# Patient Record
Sex: MALE | Race: WHITE
[De-identification: names, ages, dates, MRNs, and addresses within clinical notes are randomized per-mention and may not be internally consistent; named-entity substitution may affect disease eponyms.]

---

## 2018-10-25 ENCOUNTER — HOSPITAL ENCOUNTER (OUTPATIENT)
Dept: HOSPITAL 59 - SUR | Age: 58
Discharge: HOME | End: 2018-10-25
Attending: ORTHOPAEDIC SURGERY
Payer: COMMERCIAL

## 2018-10-25 DIAGNOSIS — M22.42: ICD-10-CM

## 2018-10-25 DIAGNOSIS — S83.232A: Primary | ICD-10-CM

## 2018-10-25 DIAGNOSIS — M94.262: ICD-10-CM

## 2018-10-25 PROCEDURE — 29881 ARTHRS KNE SRG MNISECTMY M/L: CPT

## 2018-10-25 PROCEDURE — 01400 ANES OPN/ARTHRS KNEE JT NOS: CPT

## 2018-10-25 PROCEDURE — 93005 ELECTROCARDIOGRAM TRACING: CPT

## 2018-10-25 NOTE — OPERATIVE NOTE
DATE OF SURGERY: 10/25/2018 



Surgeon: Jeremy Lr DO



PREOPERATIVE DIAGNOSES: 

1. Torn medial meniscus of the left knee. 

2. Chondromalacia of the left knee. 



POSTOPERATIVE DIAGNOSES: 

1. Torn medial meniscus, left knee. 

2. 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 



OPERATION: Arthroscopic partial medial meniscectomy of the left knee. 



DESCRIPTION OF PROCEDURE: This 58-year-old male was taken to the operating room and placed in the 
supine position on the operating room table. General anesthetic was administered. The left lower 
extremity was elevated, exsanguinated, and the tourniquet inflated to 300 mmHg. The arthroscopic 
knee erwin applied. The left knee prepped with Hibiclens and draped in the usual sterile fashion. 



An inferolateral portal was established for the 4 mm arthroscope. Initial evaluation of the joint 
demonstrated mild grade 2 chondromalacia of the patella and the oblique facet and medial facet, 
lateral facet being relatively spared. The trochlea appeared normal. 



The medial compartment was entered and mild grade 2 chondromalacia of the medial femoral condyle was 
present. Again no loose fragments of articular cartilage and it was not further disturbed. The 
medial compartment also demonstrated a complex tear of the medial meniscus. We could see where he 
had had work done on the posterior horn of the medial meniscus previously. There was a remnant of a 
horizontal cleavage tear posteriorly. This was not terribly unstable but some loose flaps were 
present, and these were trimmed with the rotating shaver. However, the new tear started at about the 
10-o'clock position and extended anteriorly to about the 9-o'clock position. Utilizing the basket 
forceps and rotating shaver, we smoothed and trimmed and balanced the meniscus to a smooth stable 
rim and contoured. It was not unstable after probing. The medial compartment was suctioned. 



We then directed our attention to the intracondylar notch, which was seen to be normal. 



The lateral compartment was entered. No defects of the articular cartilage or meniscus on the 
lateral side of the joint were present. The joint was suctioned and the instruments were removed. 
The portals were then infiltrated with 0.25% Marcaine with epinephrine. Sterile dressings applied. 
Tourniquet and knee erwin released and the patient taken to the recovery room in satisfactory 
condition. 



GROSS PATHOLOGY: There was evidence of previous repair of the medial meniscus but new tear was also 
present as described above. Mild grade 2 chondromalacia of the patella and medial femoral condyle 
were present as described above. CC: Polo LA